# Patient Record
Sex: FEMALE | Race: WHITE | NOT HISPANIC OR LATINO | Employment: UNEMPLOYED | URBAN - METROPOLITAN AREA
[De-identification: names, ages, dates, MRNs, and addresses within clinical notes are randomized per-mention and may not be internally consistent; named-entity substitution may affect disease eponyms.]

---

## 2020-01-06 ENCOUNTER — INITIAL PRENATAL (OUTPATIENT)
Dept: OBGYN | Facility: CLINIC | Age: 30
End: 2020-01-06
Payer: MEDICAID

## 2020-01-06 ENCOUNTER — HOSPITAL ENCOUNTER (OUTPATIENT)
Facility: MEDICAL CENTER | Age: 30
End: 2020-01-06
Attending: NURSE PRACTITIONER
Payer: MEDICAID

## 2020-01-06 VITALS — SYSTOLIC BLOOD PRESSURE: 118 MMHG | DIASTOLIC BLOOD PRESSURE: 50 MMHG | WEIGHT: 152 LBS | BODY MASS INDEX: 25.29 KG/M2

## 2020-01-06 DIAGNOSIS — N93.8 DUB (DYSFUNCTIONAL UTERINE BLEEDING): Primary | ICD-10-CM

## 2020-01-06 DIAGNOSIS — F15.21 METHAMPHETAMINE DEPENDENCE IN REMISSION (HCC): ICD-10-CM

## 2020-01-06 DIAGNOSIS — F15.10 METHAMPHETAMINE ABUSE (HCC): ICD-10-CM

## 2020-01-06 DIAGNOSIS — Z91.410 HISTORY OF RAPE IN ADULTHOOD: ICD-10-CM

## 2020-01-06 DIAGNOSIS — Z32.00 ENCOUNTER FOR PREGNANCY TEST, RESULT UNKNOWN: ICD-10-CM

## 2020-01-06 PROBLEM — F32.A DEPRESSION: Status: ACTIVE | Noted: 2020-01-06

## 2020-01-06 LAB
INT CON NEG: NEGATIVE
INT CON POS: POSITIVE
POC URINE PREGNANCY TEST: POSITIVE

## 2020-01-06 PROCEDURE — 87491 CHLMYD TRACH DNA AMP PROBE: CPT

## 2020-01-06 PROCEDURE — 87591 N.GONORRHOEAE DNA AMP PROB: CPT

## 2020-01-06 PROCEDURE — 81025 URINE PREGNANCY TEST: CPT | Performed by: NURSE PRACTITIONER

## 2020-01-06 PROCEDURE — 88175 CYTOPATH C/V AUTO FLUID REDO: CPT

## 2020-01-06 PROCEDURE — 99385 PREV VISIT NEW AGE 18-39: CPT | Performed by: NURSE PRACTITIONER

## 2020-01-06 PROCEDURE — 87624 HPV HI-RISK TYP POOLED RSLT: CPT

## 2020-01-06 RX ORDER — ALBUTEROL SULFATE 90 UG/1
2 AEROSOL, METERED RESPIRATORY (INHALATION) EVERY 6 HOURS PRN
Qty: 8.5 G | Refills: 1 | Status: SHIPPED | OUTPATIENT
Start: 2020-01-06

## 2020-01-06 ASSESSMENT — ENCOUNTER SYMPTOMS
NEUROLOGICAL NEGATIVE: 1
PSYCHIATRIC NEGATIVE: 1
RESPIRATORY NEGATIVE: 1
CARDIOVASCULAR NEGATIVE: 1
MUSCULOSKELETAL NEGATIVE: 1
CONSTITUTIONAL NEGATIVE: 1
GASTROINTESTINAL NEGATIVE: 1
EYES NEGATIVE: 1

## 2020-01-06 NOTE — LETTER
January 6, 2020            Thu Caraballo is pregnant with an estimated delivery date of 07/25/2020 at this time.        Thank you,          GORDO Waters.    Electronically Signed

## 2020-01-06 NOTE — PROGRESS NOTES
Thu Caraballo is a 29 y.o. y.o. female who presents for dysfunctional uterine bleeding.        HPI Comments: Pt presents for dysfunctional uterine bleeding. Pt has no complaints today. LMP 10/26/19.  Reports methamphetamine use up until 1 week ago as well as some alcohol use until she found out she was pregnant.  She is currently being seen at [facility] 2-3 times/week for counseling services.    .  Review of Systems   Pertinent positives documented in HPI and all other systems reviewed & are negative  Review of Systems   Constitutional: Negative.    HENT: Negative.    Eyes: Negative.    Respiratory: Negative.    Cardiovascular: Negative.    Gastrointestinal: Negative.    Genitourinary: Negative.    Musculoskeletal: Negative.    Skin: Negative.    Neurological: Negative.    Endo/Heme/Allergies: Negative.    Psychiatric/Behavioral: Negative.    All other systems reviewed and are negative.      All PMH, PSH, allergies, social history and FH reviewed and updated today:  Past Medical History:   Diagnosis Date   • Anxiety    • Back pain    • Depression    • Insomnia    • Migraine    • Panic disorder    • PTSD (post-traumatic stress disorder)      No past surgical history on file.  Patient has no known allergies.  Social History     Socioeconomic History   • Marital status: Single     Spouse name: Not on file   • Number of children: Not on file   • Years of education: Not on file   • Highest education level: Not on file   Occupational History   • Not on file   Social Needs   • Financial resource strain: Not on file   • Food insecurity:     Worry: Not on file     Inability: Not on file   • Transportation needs:     Medical: Not on file     Non-medical: Not on file   Tobacco Use   • Smoking status: Not on file   Substance and Sexual Activity   • Alcohol use: Yes     Comment: Socially   • Drug use: Not on file   • Sexual activity: Not Currently   Lifestyle   • Physical activity:     Days per week: Not on file     Minutes  per session: Not on file   • Stress: Not on file   Relationships   • Social connections:     Talks on phone: Not on file     Gets together: Not on file     Attends [employer] service: Not on file     Active member of club or organization: Not on file     Attends meetings of clubs or organizations: Not on file     Relationship status: Not on file   • Intimate partner violence:     Fear of current or ex partner: Not on file     Emotionally abused: Not on file     Physically abused: Not on file     Forced sexual activity: Not on file   Other Topics Concern   • Not on file   Social History Narrative    ** Merged History Encounter **          Family History   Problem Relation Age of Onset   • Other Mother      Medications:   Current Outpatient Medications Ordered in Epic   Medication Sig Dispense Refill   • oxycodone-acetaminophen (PERCOCET) 5-325 MG TABS Take 1-2 Tabs by mouth every 6 hours as needed. 20 Each 0   • fluoxetine (PROZAC) 20 MG CAPS Take 20 mg by mouth 2 times a day.     • alprazolam (XANAX) 1 MG TABS Take 1 mg by mouth at bedtime as needed.     • alprazolam (XANAX) 0.5 MG TABS Take 0.5 mg by mouth at bedtime as needed.     • fluoxetine (PROZAC) 20 MG CAPS Take 1 Cap by mouth every day. 30 Cap 0   • oxycodone-acetaminophen (PERCOCET) 7.5-325 MG per tablet Take 1 Tab by mouth every four hours as needed. 20 Each 0     No current [facility]-ordered facility-administered medications on file.           Objective:   Vital measurements:  There were no vitals taken for this visit.  There is no height or weight on file to calculate BMI. (Goal BM I>18 <25)    Physical Exam   Nursing note and vitals reviewed.  Constitutional: She is oriented to person, place, and time. She appears well-developed and well-nourished. No distress.     HEENT:   Head: Normocephalic and atraumatic.   Right Ear: External ear normal.   Left Ear: External ear normal.   Nose: Nose normal.   Eyes: Conjunctivae and EOM are normal. Pupils are equal,  round, and reactive to light. No scleral icterus.     Neck: Normal range of motion. Neck supple. No tracheal deviation present. No thyromegaly present.     Pulmonary/Chest: Effort normal and breath sounds normal. No respiratory distress. She has no wheezes. She has no rales. She exhibits no tenderness.     Cardiovascular: Regular, rate and rhythm. No JVD.    Abdominal: Soft. Bowel sounds are normal. She exhibits no distension and no mass. No tenderness. She has no rebound and no guarding.     Genitourinary:    Musculoskeletal: Normal range of motion. She exhibits no edema and no tenderness.     Lymphadenopathy: She has no cervical adenopathy.     Neurological: She is alert and oriented to person, place, and time. She exhibits normal muscle tone.     Skin: Skin is warm and dry. No rash noted. She is not diaphoretic. No erythema. No pallor.     Psychiatric: She is very tired appearing with her eyes frequently closing and appearing to doze off during the exam.  She is teary at times during the exam.         + fetal cardiac activity and fetal movement on BSUS       Assessment:     Dysfunctional uterine bleeding  Positive pregnancy test  Methamphetamine abuse  Alcohol use during pregnancy  Asthma    Plan:   Pap and physical exam performed  Referral to Dr So for hx of meth use in pregnancy and counseling on remaining drug free during pregnancy.  Referral to cardiology for hx of daily meth use  PNP and UDS ordered  Referral to    Albuterol inhaler rx  New OB 2-4 weeks

## 2020-01-06 NOTE — NON-PROVIDER
Pt here for her Confirmation of pregnancy   LMP: 10/19/19  Pregnancy test: positive  Ph# [phone]  Pharmacy confirmed w/ pt

## 2020-01-07 DIAGNOSIS — Z32.00 ENCOUNTER FOR PREGNANCY TEST, RESULT UNKNOWN: ICD-10-CM

## 2020-01-07 LAB
C TRACH DNA GENITAL QL NAA+PROBE: NEGATIVE
CYTOLOGY REG CYTOL: NORMAL
HPV HR 12 DNA CVX QL NAA+PROBE: NEGATIVE
HPV16 DNA SPEC QL NAA+PROBE: NEGATIVE
HPV18 DNA SPEC QL NAA+PROBE: NEGATIVE
N GONORRHOEA DNA GENITAL QL NAA+PROBE: NEGATIVE
SPECIMEN SOURCE: NORMAL
SPECIMEN SOURCE: NORMAL

## 2020-01-21 ENCOUNTER — TELEPHONE (OUTPATIENT)
Dept: OBGYN | Facility: CLINIC | Age: 30
End: 2020-01-21

## 2020-01-21 NOTE — TELEPHONE ENCOUNTER
----- Message from LIU Waters sent at 1/7/2020  7:25 PM PST -----  Negative pap.  If she has s/s of yeast infection she can take an OTC monistat 7 day treatment.    Unable to contact pt, msg left to call back.

## 2020-07-03 ENCOUNTER — HOSPITAL ENCOUNTER (OUTPATIENT)
Dept: HOSPITAL 8 - LDOP | Age: 30
Discharge: HOME | End: 2020-07-03
Attending: OBSTETRICS & GYNECOLOGY
Payer: MEDICAID

## 2020-07-03 VITALS — BODY MASS INDEX: 31.16 KG/M2 | WEIGHT: 187 LBS | HEIGHT: 65 IN

## 2020-07-03 VITALS — DIASTOLIC BLOOD PRESSURE: 66 MMHG | SYSTOLIC BLOOD PRESSURE: 132 MMHG

## 2020-07-03 DIAGNOSIS — O26.893: Primary | ICD-10-CM

## 2020-07-03 DIAGNOSIS — Z3A.35: ICD-10-CM

## 2020-07-03 DIAGNOSIS — M79.89: ICD-10-CM

## 2020-07-03 LAB
ALBUMIN SERPL-MCNC: 2.5 G/DL (ref 3.4–5)
ALP SERPL-CCNC: 175 U/L (ref 45–117)
ALT SERPL-CCNC: 13 U/L (ref 12–78)
ANION GAP SERPL CALC-SCNC: 9 MMOL/L (ref 5–15)
BASOPHILS # BLD AUTO: 0.01 X10^3/UL (ref 0–0.1)
BASOPHILS NFR BLD AUTO: 0 % (ref 0–1)
BILIRUB SERPL-MCNC: 0.2 MG/DL (ref 0.2–1)
CALCIUM SERPL-MCNC: 8.2 MG/DL (ref 8.5–10.1)
CHLORIDE SERPL-SCNC: 113 MMOL/L (ref 98–107)
CREAT SERPL-MCNC: 0.7 MG/DL (ref 0.55–1.02)
EOSINOPHIL # BLD AUTO: 0.28 X10^3/UL (ref 0–0.4)
EOSINOPHIL NFR BLD AUTO: 3 % (ref 1–7)
ERYTHROCYTE [DISTWIDTH] IN BLOOD BY AUTOMATED COUNT: 13.3 % (ref 9.6–15.2)
HBV CORE IGM SERPL QL IA: 19 MG/DL (ref 0–12)
HBV SURFACE AB SER RIA-ACNC: 105 MG/DL
LYMPHOCYTES # BLD AUTO: 1.8 X10^3/UL (ref 1–3.4)
LYMPHOCYTES NFR BLD AUTO: 18 % (ref 22–44)
MCH RBC QN AUTO: 28.1 PG (ref 27–34.8)
MCHC RBC AUTO-ENTMCNC: 33.4 G/DL (ref 32.4–35.8)
MCV RBC AUTO: 83.9 FL (ref 80–100)
MD: NO
MICROSCOPIC: (no result)
MONOCYTES # BLD AUTO: 0.83 X10^3/UL (ref 0.2–0.8)
MONOCYTES NFR BLD AUTO: 8 % (ref 2–9)
NEUTROPHILS # BLD AUTO: 7.21 X10^3/UL (ref 1.8–6.8)
NEUTROPHILS NFR BLD AUTO: 71 % (ref 42–75)
PLATELET # BLD AUTO: 284 X10^3/UL (ref 130–400)
PMV BLD AUTO: 8.2 FL (ref 7.4–10.4)
PROT SERPL-MCNC: 6.6 G/DL (ref 6.4–8.2)
RBC # BLD AUTO: 3.51 X10^6/UL (ref 3.82–5.3)

## 2020-07-03 PROCEDURE — 59025 FETAL NON-STRESS TEST: CPT

## 2020-07-03 PROCEDURE — 36415 COLL VENOUS BLD VENIPUNCTURE: CPT

## 2020-07-03 PROCEDURE — 80307 DRUG TEST PRSMV CHEM ANLYZR: CPT

## 2020-07-03 PROCEDURE — 82248 BILIRUBIN DIRECT: CPT

## 2020-07-03 PROCEDURE — 84550 ASSAY OF BLOOD/URIC ACID: CPT

## 2020-07-03 PROCEDURE — 82570 ASSAY OF URINE CREATININE: CPT

## 2020-07-03 PROCEDURE — 81001 URINALYSIS AUTO W/SCOPE: CPT

## 2020-07-03 PROCEDURE — 85025 COMPLETE CBC W/AUTO DIFF WBC: CPT

## 2020-07-03 PROCEDURE — 99211 OFF/OP EST MAY X REQ PHY/QHP: CPT

## 2020-07-03 PROCEDURE — G0463 HOSPITAL OUTPT CLINIC VISIT: HCPCS

## 2020-07-03 PROCEDURE — 80053 COMPREHEN METABOLIC PANEL: CPT

## 2020-07-03 PROCEDURE — 84156 ASSAY OF PROTEIN URINE: CPT

## 2020-07-25 ENCOUNTER — HOSPITAL ENCOUNTER (INPATIENT)
Dept: HOSPITAL 8 - LDOP | Age: 30
LOS: 3 days | Discharge: HOME | End: 2020-07-28
Attending: OBSTETRICS & GYNECOLOGY | Admitting: OBSTETRICS & GYNECOLOGY
Payer: MEDICAID

## 2020-07-25 VITALS — WEIGHT: 211.42 LBS | BODY MASS INDEX: 36.09 KG/M2 | HEIGHT: 64 IN

## 2020-07-25 VITALS — SYSTOLIC BLOOD PRESSURE: 133 MMHG | DIASTOLIC BLOOD PRESSURE: 66 MMHG

## 2020-07-25 DIAGNOSIS — Z82.49: ICD-10-CM

## 2020-07-25 DIAGNOSIS — F32.9: ICD-10-CM

## 2020-07-25 DIAGNOSIS — F41.9: ICD-10-CM

## 2020-07-25 DIAGNOSIS — F15.10: ICD-10-CM

## 2020-07-25 DIAGNOSIS — Z3A.39: ICD-10-CM

## 2020-07-25 DIAGNOSIS — F43.10: ICD-10-CM

## 2020-07-25 DIAGNOSIS — Z20.828: ICD-10-CM

## 2020-07-25 LAB
BASOPHILS # BLD AUTO: 0.02 X10^3/UL (ref 0–0.1)
BASOPHILS NFR BLD AUTO: 0 % (ref 0–1)
EOSINOPHIL # BLD AUTO: 0.16 X10^3/UL (ref 0–0.4)
EOSINOPHIL NFR BLD AUTO: 2 % (ref 1–7)
ERYTHROCYTE [DISTWIDTH] IN BLOOD BY AUTOMATED COUNT: 15.1 % (ref 9.6–15.2)
LYMPHOCYTES # BLD AUTO: 1.63 X10^3/UL (ref 1–3.4)
LYMPHOCYTES NFR BLD AUTO: 19 % (ref 22–44)
MCH RBC QN AUTO: 28.1 PG (ref 27–34.8)
MCHC RBC AUTO-ENTMCNC: 33.3 G/DL (ref 32.4–35.8)
MCV RBC AUTO: 84.4 FL (ref 80–100)
MD: NO
MONOCYTES # BLD AUTO: 0.62 X10^3/UL (ref 0.2–0.8)
MONOCYTES NFR BLD AUTO: 7 % (ref 2–9)
NEUTROPHILS # BLD AUTO: 6.4 X10^3/UL (ref 1.8–6.8)
NEUTROPHILS NFR BLD AUTO: 72 % (ref 42–75)
PLATELET # BLD AUTO: 303 X10^3/UL (ref 130–400)
PMV BLD AUTO: 8 FL (ref 7.4–10.4)
RBC # BLD AUTO: 3.53 X10^6/UL (ref 3.82–5.3)

## 2020-07-25 PROCEDURE — 80307 DRUG TEST PRSMV CHEM ANLYZR: CPT

## 2020-07-25 PROCEDURE — 36415 COLL VENOUS BLD VENIPUNCTURE: CPT

## 2020-07-25 PROCEDURE — 88307 TISSUE EXAM BY PATHOLOGIST: CPT

## 2020-07-25 PROCEDURE — 85025 COMPLETE CBC W/AUTO DIFF WBC: CPT

## 2020-07-25 PROCEDURE — 86900 BLOOD TYPING SEROLOGIC ABO: CPT

## 2020-07-25 PROCEDURE — 86850 RBC ANTIBODY SCREEN: CPT

## 2020-07-25 PROCEDURE — 87635 SARS-COV-2 COVID-19 AMP PRB: CPT

## 2020-07-25 PROCEDURE — 86592 SYPHILIS TEST NON-TREP QUAL: CPT

## 2020-07-25 RX ADMIN — SODIUM CHLORIDE, SODIUM LACTATE, POTASSIUM CHLORIDE, AND CALCIUM CHLORIDE SCH MLS/HR: .6; .31; .03; .02 INJECTION, SOLUTION INTRAVENOUS at 20:03

## 2020-07-25 RX ADMIN — OLANZAPINE SCH MG: 2.5 TABLET, FILM COATED ORAL at 20:46

## 2020-07-26 VITALS — SYSTOLIC BLOOD PRESSURE: 115 MMHG | DIASTOLIC BLOOD PRESSURE: 75 MMHG

## 2020-07-26 VITALS — DIASTOLIC BLOOD PRESSURE: 65 MMHG | SYSTOLIC BLOOD PRESSURE: 119 MMHG

## 2020-07-26 LAB
BASOPHILS # BLD AUTO: 0 X10^3/UL (ref 0–0.1)
BASOPHILS NFR BLD AUTO: 0 % (ref 0–1)
EOSINOPHIL # BLD AUTO: 0.08 X10^3/UL (ref 0–0.4)
EOSINOPHIL NFR BLD AUTO: 0 % (ref 1–7)
ERYTHROCYTE [DISTWIDTH] IN BLOOD BY AUTOMATED COUNT: 15.7 % (ref 9.6–15.2)
LYMPHOCYTES # BLD AUTO: 1.52 X10^3/UL (ref 1–3.4)
LYMPHOCYTES NFR BLD AUTO: 8 % (ref 22–44)
MCH RBC QN AUTO: 27.9 PG (ref 27–34.8)
MCHC RBC AUTO-ENTMCNC: 32.5 G/DL (ref 32.4–35.8)
MCV RBC AUTO: 86 FL (ref 80–100)
MD: (no result)
MONOCYTES # BLD AUTO: 1.15 X10^3/UL (ref 0.2–0.8)
MONOCYTES NFR BLD AUTO: 6 % (ref 2–9)
NEUTROPHILS # BLD AUTO: 15.41 X10^3/UL (ref 1.8–6.8)
NEUTROPHILS NFR BLD AUTO: 85 % (ref 42–75)
PLATELET # BLD AUTO: 254 X10^3/UL (ref 130–400)
PMV BLD AUTO: 7.9 FL (ref 7.4–10.4)
RBC # BLD AUTO: 3.78 X10^6/UL (ref 3.82–5.3)

## 2020-07-26 RX ADMIN — BUPRENORPHINE HCL SCH MG: 2 TABLET SUBLINGUAL at 07:28

## 2020-07-26 RX ADMIN — OLANZAPINE SCH MG: 2.5 TABLET, FILM COATED ORAL at 21:00

## 2020-07-26 RX ADMIN — SODIUM CHLORIDE, SODIUM LACTATE, POTASSIUM CHLORIDE, AND CALCIUM CHLORIDE SCH MLS/HR: .6; .31; .03; .02 INJECTION, SOLUTION INTRAVENOUS at 07:34

## 2020-07-26 RX ADMIN — Medication SCH MLS/HR: at 12:23

## 2020-07-26 RX ADMIN — SODIUM CHLORIDE, SODIUM LACTATE, POTASSIUM CHLORIDE, AND CALCIUM CHLORIDE SCH MLS/HR: .6; .31; .03; .02 INJECTION, SOLUTION INTRAVENOUS at 02:24

## 2020-07-26 RX ADMIN — SODIUM CHLORIDE, SODIUM LACTATE, POTASSIUM CHLORIDE, CALCIUM CHLORIDE, AND DEXTROSE MONOHYDRATE SCH MLS/HR: 600; 310; 30; 20; 5 INJECTION, SOLUTION INTRAVENOUS at 02:24

## 2020-07-26 RX ADMIN — IBUPROFEN PRN MG: 800 TABLET ORAL at 22:50

## 2020-07-26 RX ADMIN — Medication SCH MLS/HR: at 22:23

## 2020-07-26 RX ADMIN — DOCUSATE SODIUM PRN MG: 100 CAPSULE, LIQUID FILLED ORAL at 20:59

## 2020-07-26 RX ADMIN — SODIUM CHLORIDE, SODIUM LACTATE, POTASSIUM CHLORIDE, CALCIUM CHLORIDE, AND DEXTROSE MONOHYDRATE SCH MLS/HR: 600; 310; 30; 20; 5 INJECTION, SOLUTION INTRAVENOUS at 07:31

## 2020-07-26 RX ADMIN — IBUPROFEN PRN MG: 800 TABLET ORAL at 14:36

## 2020-07-27 VITALS — SYSTOLIC BLOOD PRESSURE: 119 MMHG | DIASTOLIC BLOOD PRESSURE: 77 MMHG

## 2020-07-27 VITALS — SYSTOLIC BLOOD PRESSURE: 133 MMHG | DIASTOLIC BLOOD PRESSURE: 86 MMHG

## 2020-07-27 VITALS — DIASTOLIC BLOOD PRESSURE: 80 MMHG | SYSTOLIC BLOOD PRESSURE: 135 MMHG

## 2020-07-27 VITALS — DIASTOLIC BLOOD PRESSURE: 82 MMHG | SYSTOLIC BLOOD PRESSURE: 121 MMHG

## 2020-07-27 VITALS — DIASTOLIC BLOOD PRESSURE: 72 MMHG | SYSTOLIC BLOOD PRESSURE: 120 MMHG

## 2020-07-27 PROCEDURE — 3E033VJ INTRODUCTION OF OTHER HORMONE INTO PERIPHERAL VEIN, PERCUTANEOUS APPROACH: ICD-10-PCS | Performed by: OBSTETRICS & GYNECOLOGY

## 2020-07-27 RX ADMIN — PRENATAL VIT W/ FE FUMARATE-FA TAB 27-0.8 MG SCH EACH: 27-0.8 TAB at 08:08

## 2020-07-27 RX ADMIN — Medication SCH MLS/HR: at 08:23

## 2020-07-27 RX ADMIN — IBUPROFEN PRN MG: 800 TABLET ORAL at 22:58

## 2020-07-27 RX ADMIN — DOCUSATE SODIUM PRN MG: 100 CAPSULE, LIQUID FILLED ORAL at 08:08

## 2020-07-27 RX ADMIN — IBUPROFEN PRN MG: 800 TABLET ORAL at 14:20

## 2020-07-27 RX ADMIN — Medication SCH MLS/HR: at 18:23

## 2020-07-27 RX ADMIN — OLANZAPINE SCH MG: 2.5 TABLET, FILM COATED ORAL at 21:04

## 2020-07-27 RX ADMIN — IBUPROFEN PRN MG: 800 TABLET ORAL at 06:30

## 2020-07-27 RX ADMIN — BUPRENORPHINE HCL SCH MG: 2 TABLET SUBLINGUAL at 08:08

## 2020-07-27 RX ADMIN — DOCUSATE SODIUM PRN MG: 100 CAPSULE, LIQUID FILLED ORAL at 21:04

## 2020-07-28 VITALS — DIASTOLIC BLOOD PRESSURE: 76 MMHG | SYSTOLIC BLOOD PRESSURE: 134 MMHG

## 2020-07-28 RX ADMIN — DOCUSATE SODIUM PRN MG: 100 CAPSULE, LIQUID FILLED ORAL at 09:31

## 2020-07-28 RX ADMIN — IBUPROFEN PRN MG: 800 TABLET ORAL at 09:31

## 2020-07-28 RX ADMIN — PRENATAL VIT W/ FE FUMARATE-FA TAB 27-0.8 MG SCH EACH: 27-0.8 TAB at 09:31

## 2020-07-28 RX ADMIN — OLANZAPINE SCH MG: 2.5 TABLET, FILM COATED ORAL at 09:00

## 2020-07-28 RX ADMIN — Medication SCH MLS/HR: at 04:23

## 2020-07-28 RX ADMIN — BUPRENORPHINE HCL SCH MG: 2 TABLET SUBLINGUAL at 09:32

## 2020-07-28 RX ADMIN — Medication SCH MLS/HR: at 14:23

## 2021-01-12 ENCOUNTER — OFFICE VISIT (OUTPATIENT)
Dept: URGENT CARE | Facility: CLINIC | Age: 31
End: 2021-01-12
Payer: MEDICAID

## 2021-01-12 VITALS
OXYGEN SATURATION: 95 % | HEIGHT: 64 IN | TEMPERATURE: 97.6 F | HEART RATE: 76 BPM | RESPIRATION RATE: 14 BRPM | WEIGHT: 200 LBS | DIASTOLIC BLOOD PRESSURE: 72 MMHG | BODY MASS INDEX: 34.15 KG/M2 | SYSTOLIC BLOOD PRESSURE: 124 MMHG

## 2021-01-12 DIAGNOSIS — L85.3 DRY SKIN: ICD-10-CM

## 2021-01-12 DIAGNOSIS — Z20.7 EXPOSURE TO SCABIES: ICD-10-CM

## 2021-01-12 PROCEDURE — 99203 OFFICE O/P NEW LOW 30 MIN: CPT | Performed by: PHYSICIAN ASSISTANT

## 2021-01-12 RX ORDER — PERMETHRIN 50 MG/G
CREAM TOPICAL
Qty: 60 G | Refills: 0 | Status: SHIPPED | OUTPATIENT
Start: 2021-01-12

## 2021-01-12 ASSESSMENT — ENCOUNTER SYMPTOMS
EYE DISCHARGE: 0
FEVER: 0
CHILLS: 0
EYE REDNESS: 0
SORE THROAT: 0

## 2021-01-13 NOTE — PROGRESS NOTES
"Subjective:      Thu Caraballo is a 30 y.o. female who presents with Other (scabies , was in an outbreak of scabies were she lives)            Patient is a 30-year-old female who presents to urgent care requesting treatment prophylactically for scabies.  Patient is currently at [facility] where she reports that there is \"an outbreak \"of scabies along with lice.  She does report some dry skin to her inner thighs otherwise is without notable rash that she is aware of.  She also denies any itchy scalp.  She does report that she has been at this facility for 6 months.  No one in her home is with scabies or lice as it is a different household.  She also mentions that she has a 5 month old at the facility as well- no evidence of rash or itchiness.       Other  This is a new problem. The current episode started today. Pertinent negatives include no chills, fever, rash or sore throat. Nothing aggravates the symptoms. She has tried nothing for the symptoms.       Review of Systems   Constitutional: Negative for chills and fever.   HENT: Negative for sore throat.    Eyes: Negative for discharge and redness.   Skin: Negative for itching and rash.        Dry skin to inner thighs.      All other systems reviewed and are negative.         Objective:     /72   Pulse 76   Temp 36.4 °C (97.6 °F) (Temporal)   Resp 14   Ht 1.626 m (5' 4\")   Wt 90.7 kg (200 lb)   SpO2 95%   BMI 34.33 kg/m²    PMH:  has a past medical history of Anxiety, Asthma, Back pain, Depression, Head ache, Insomnia, Migraine, Panic disorder, and PTSD (post-traumatic stress disorder). She also has no past medical history of Addisons disease ([facility]), Adrenal disorder (HCC), Allergy, Anemia, Arrhythmia, Arthritis, Blood transfusion without reported diagnosis, Cancer ([facility]), Cataract, CHF (congestive heart failure) ([facility]), Clotting disorder ([facility]), COPD (chronic obstructive pulmonary disease) ([facility]), Cushings syndrome ([facility]), Diabetes ([facility]), Diabetic neuropathy " (HCC), GERD (gastroesophageal reflux disease), Glaucoma, Goiter, Heart attack (HCC), Heart murmur, HIV (human immunodeficiency virus infection) (HCC), Hyperlipidemia, Hypertension, IBD (inflammatory bowel disease), Kidney disease, Meningitis, Muscle disorder, Osteoporosis, Parathyroid disorder (HCC), Pituitary disease (HCC), Pulmonary emphysema (HCC), Seizure (HCC), Sickle cell disease (HCC), Stroke (HCC), Substance abuse (HCC), Thyroid disease, Tuberculosis, or Urinary tract infection.  MEDS: Reviewed .   ALLERGIES: No Known Allergies  SURGHX: No past surgical history on file.  SOCHX:  reports that she has been smoking cigarettes. She has been smoking about 0.50 packs per day. She has never used smokeless tobacco. She reports current alcohol use. She reports current drug use. Drug: Methamphetamines.  FH: Family history was reviewed, no pertinent findings to report    Physical Exam  Vitals signs reviewed.   Constitutional:       General: She is not in acute distress.     Appearance: She is well-developed.   HENT:      Head: Normocephalic and atraumatic.   Eyes:      Conjunctiva/sclera: Conjunctivae normal.      Pupils: Pupils are equal, round, and reactive to light.   Neck:      Musculoskeletal: Normal range of motion and neck supple.      Trachea: No tracheal deviation.   Cardiovascular:      Rate and Rhythm: Normal rate.   Pulmonary:      Effort: Pulmonary effort is normal. No respiratory distress.   Skin:     General: Skin is warm.             Comments: Erythematous patches to bilateral inner thighs. Without pruritis, burrows, or excoriation.      Neurological:      Mental Status: She is alert and oriented to person, place, and time.      Coordination: Coordination normal.   Psychiatric:         Behavior: Behavior normal.         Thought Content: Thought content normal.         Judgment: Judgment normal.                 Assessment/Plan:        1. Exposure to scabies  - permethrin (ELIMITE) 5 % Cream; Apply thin  "layer from scalp to toes- leave on 8-10 hours and then rinse.  Dispense: 60 g; Refill: 0    2. Dry skin    Area to thighs is consistent with dry patches- no evidence of infestation.   Will write for the above.   Discussed that I do not feel comfortable tx. 5 month old \"prophylactivally\" for scabies if not present- encouraged her to reach out to peds to further discuss this if needed.   RTC PRN.   Please note that this dictation was created using voice recognition software. I have made every reasonable attempt to correct obvious errors, but I expect that there are errors of grammar and possibly content that I did not discover before finalizing the note.    Appropriate PPE worn at all times by provider.   Pt. Had face mask on throughout entirety of the visit other than oropharyngeal examination today.         "

## 2021-09-30 ENCOUNTER — HOSPITAL ENCOUNTER (EMERGENCY)
Dept: HOSPITAL 8 - ED | Age: 31
Discharge: HOME | End: 2021-09-30
Payer: MEDICAID

## 2021-09-30 VITALS — WEIGHT: 192.9 LBS | HEIGHT: 64 IN | BODY MASS INDEX: 32.93 KG/M2

## 2021-09-30 VITALS — SYSTOLIC BLOOD PRESSURE: 127 MMHG | DIASTOLIC BLOOD PRESSURE: 65 MMHG

## 2021-09-30 DIAGNOSIS — F17.210: ICD-10-CM

## 2021-09-30 DIAGNOSIS — N30.01: Primary | ICD-10-CM

## 2021-09-30 DIAGNOSIS — R11.0: ICD-10-CM

## 2021-09-30 DIAGNOSIS — R10.30: ICD-10-CM

## 2021-09-30 DIAGNOSIS — R30.0: ICD-10-CM

## 2022-09-23 ENCOUNTER — HOSPITAL ENCOUNTER (EMERGENCY)
Facility: MEDICAL CENTER | Age: 32
End: 2022-09-23
Attending: EMERGENCY MEDICINE
Payer: COMMERCIAL

## 2022-09-23 VITALS
OXYGEN SATURATION: 95 % | DIASTOLIC BLOOD PRESSURE: 76 MMHG | WEIGHT: 214.95 LBS | TEMPERATURE: 98.4 F | BODY MASS INDEX: 36.7 KG/M2 | SYSTOLIC BLOOD PRESSURE: 132 MMHG | HEIGHT: 64 IN | RESPIRATION RATE: 16 BRPM | HEART RATE: 62 BPM

## 2022-09-23 DIAGNOSIS — K04.7 DENTAL ABSCESS: ICD-10-CM

## 2022-09-23 DIAGNOSIS — K02.9 PAIN DUE TO DENTAL CARIES: ICD-10-CM

## 2022-09-23 DIAGNOSIS — K01.1 IMPACTED THIRD MOLAR TOOTH: ICD-10-CM

## 2022-09-23 PROCEDURE — A9270 NON-COVERED ITEM OR SERVICE: HCPCS | Performed by: EMERGENCY MEDICINE

## 2022-09-23 PROCEDURE — 700102 HCHG RX REV CODE 250 W/ 637 OVERRIDE(OP): Performed by: EMERGENCY MEDICINE

## 2022-09-23 PROCEDURE — 96372 THER/PROPH/DIAG INJ SC/IM: CPT

## 2022-09-23 PROCEDURE — 700111 HCHG RX REV CODE 636 W/ 250 OVERRIDE (IP): Performed by: EMERGENCY MEDICINE

## 2022-09-23 PROCEDURE — 99283 EMERGENCY DEPT VISIT LOW MDM: CPT

## 2022-09-23 RX ORDER — CLINDAMYCIN HYDROCHLORIDE 150 MG/1
300 CAPSULE ORAL ONCE
Status: COMPLETED | OUTPATIENT
Start: 2022-09-23 | End: 2022-09-23

## 2022-09-23 RX ORDER — CLINDAMYCIN HYDROCHLORIDE 300 MG/1
300 CAPSULE ORAL 3 TIMES DAILY
Qty: 30 CAPSULE | Refills: 0 | Status: SHIPPED | OUTPATIENT
Start: 2022-09-23 | End: 2022-10-03

## 2022-09-23 RX ORDER — HYDROMORPHONE HYDROCHLORIDE 1 MG/ML
1 INJECTION, SOLUTION INTRAMUSCULAR; INTRAVENOUS; SUBCUTANEOUS ONCE
Status: COMPLETED | OUTPATIENT
Start: 2022-09-23 | End: 2022-09-23

## 2022-09-23 RX ORDER — OXYCODONE AND ACETAMINOPHEN 7.5; 325 MG/1; MG/1
1 TABLET ORAL EVERY 4 HOURS PRN
Qty: 30 TABLET | Refills: 0 | Status: SHIPPED | OUTPATIENT
Start: 2022-09-23 | End: 2022-09-28

## 2022-09-23 RX ORDER — ONDANSETRON 4 MG/1
4 TABLET, ORALLY DISINTEGRATING ORAL ONCE
Status: COMPLETED | OUTPATIENT
Start: 2022-09-23 | End: 2022-09-23

## 2022-09-23 RX ADMIN — ONDANSETRON 4 MG: 4 TABLET, ORALLY DISINTEGRATING ORAL at 21:30

## 2022-09-23 RX ADMIN — HYDROMORPHONE HYDROCHLORIDE 1 MG: 1 INJECTION, SOLUTION INTRAMUSCULAR; INTRAVENOUS; SUBCUTANEOUS at 21:30

## 2022-09-23 RX ADMIN — CLINDAMYCIN HYDROCHLORIDE 300 MG: 150 CAPSULE ORAL at 21:37

## 2022-09-24 NOTE — ED TRIAGE NOTES
"Chief Complaint   Patient presents with    Dental Pain     For a few months.     Pt complains of dental pain which is generalized to entire mouth. Pt reports she was seen recently and prescribed antibiotics. Pt states pain has continued and worsened. Pt states she has multiple abscesses.   Tylenol and ibuprofen not helping.  Pain rated at a 10/10.   Pt obviously uncomfortable.    /74   Pulse 85   Temp 36.5 °C (97.7 °F) (Temporal)   Resp 16   Ht 1.626 m (5' 4\")   Wt 97.5 kg (214 lb 15.2 oz)   SpO2 98%   BMI 36.90 kg/m²     "

## 2022-09-24 NOTE — ED PROVIDER NOTES
ED Provider Note    ED Provider Note    Scribed for Sonya Steele MD by Sonya Steele M.D.. 9/23/2022, 9:18 PM.    Primary care provider: Pcp Pt States None  Means of arrival: Private  History obtained from: Patient  History limited by: None    CHIEF COMPLAINT  Chief Complaint   Patient presents with    Dental Pain     For a few months.       PILO Caraballo is a 32 y.o. female who presents to the Emergency Department for evaluation of dental discomfort.  Patient relates chronically poor dentition, she has had discomfort waxing waning for the last few months.  She has recently seen a new dentist and was found to have multiple broken teeth, several cavities, and was told she will need her wisdom tooth resected.  She notes pain became quite severe yesterday evening, localized to both the mandible and maxilla bilaterally, more so to the left.  No fever, no recent trauma.  2 weeks ago she was seen for dental discomfort and started on a course of amoxicillin.  She completed that with no significant improvement and notes pain is in fact much worse today.  She also endorses nausea with about 3 episodes of emesis.    REVIEW OF SYSTEMS  Pertinent positives include mandible and maxillary discomfort with poor dentition, left more than right, nausea. Pertinent negatives include no fever, no trauma, no drainage, no bleeding.      PAST MEDICAL HISTORY   has a past medical history of Anxiety, Asthma, Back pain, Depression, Head ache, Insomnia, Migraine, Panic disorder, and PTSD (post-traumatic stress disorder).    SURGICAL HISTORY  patient denies any surgical history    SOCIAL HISTORY  Social History     Tobacco Use    Smoking status: Former     Packs/day: 0.50     Types: Cigarettes    Smokeless tobacco: Never   Vaping Use    Vaping Use: Every day    Substances: Nicotine   Substance Use Topics    Alcohol use: Not Currently     Comment: Socially    Drug use: Not Currently     Types: Methamphetamines      "Comment: stopped 1 week ago      Social History     Substance and Sexual Activity   Drug Use Not Currently    Types: Methamphetamines    Comment: stopped 1 week ago       FAMILY HISTORY  Family History   Problem Relation Age of Onset    Other Mother    Noncontributory    CURRENT MEDICATIONS  Home Medications       Reviewed by Medina Echeverria R.N. (Registered Nurse) on 09/23/22 at 2042  Med List Status: Not Addressed     Medication Last Dose Status   albuterol 108 (90 Base) MCG/ACT Aero Soln inhalation aerosol  Active   alprazolam (XANAX) 0.5 MG TABS  Active   fluoxetine (PROZAC) 20 MG CAPS  Active   fluoxetine (PROZAC) 20 MG CAPS  Active   permethrin (ELIMITE) 5 % Cream  Active                    ALLERGIES  No Known Allergies    PHYSICAL EXAM  VITAL SIGNS: /74   Pulse 85   Temp 36.5 °C (97.7 °F) (Temporal)   Resp 16   Ht 1.626 m (5' 4\")   Wt 97.5 kg (214 lb 15.2 oz)   SpO2 98%   BMI 36.90 kg/m²     General: Alert, no acute distress  Skin: Warm, dry, normal for ethnicity  Head: Normocephalic, atraumatic  Neck: Trachea midline, no tenderness  Eye: PERRL, normal conjunctiva  ENMT: Oral mucosa moist, no pharyngeal erythema or exudate.  Poor dentition throughout, dental caries, tenderness to percussion of the left maxillary first molar as well as the premolar and molars on the mandibular aspect.  No palpable fluctuance, no facial asymmetry.  Cardiovascular: Normal peripheral perfusion  Respiratory: respirations are non-labored  Musculoskeletal: No swelling, no deformity  Neurological: Alert and oriented to person, place, time, and situation  Lymphatics: Mild anterior cervical lymphadenopathy  Psychiatric: Cooperative, appropriate mood & affect          COURSE & MEDICAL DECISION MAKING  Pertinent Labs & Imaging studies reviewed. (See chart for details)    9:18 PM - Patient seen and examined at bedside. Patient will be treated with hydromorphone intramuscular injection, Zofran ODT, clindamycin.  I milligrams " "p.o..  Took detailed history and performed thorough physical exam to evaluate her symptoms. The differential diagnoses include but are not limited to: Dental abscesses, dental caries, cervical lymphadenopathy    Patient Vitals for the past 24 hrs:   BP Temp Temp src Pulse Resp SpO2 Height Weight   09/23/22 2038 -- -- -- -- -- -- 1.626 m (5' 4\") 97.5 kg (214 lb 15.2 oz)   09/23/22 2018 130/74 36.5 °C (97.7 °F) Temporal 85 16 98 % -- --        Decision Making:  This is a 32 y.o. year old female who presents with pain to both the maxilla and mandible acutely, became much more severe yesterday evening.  She has very poor dentition with multiple dental caries and history of several broken teeth and many cavities.  She also has impacted wisdom teeth noted.  Reassuringly she is not febrile, not tachycardic, not hypotensive.  No evidence of serious bacterial illness/septicemia.  No facial asymmetry that would be consistent with a drainable abscess.  I am suspicious however for likely early dental abscesses given the acute severity of the pain.  Appropriate analgesia and antibiotics initiated here in the ED. she will need to follow-up with her dentist soon as possible, given the impacted wisdom teeth I will also refer her to oral surgery.    I reviewed prescription monitoring program for patient's narcotic use before prescribing a scheduled drug.The patient will not drink alcohol nor drive with prescribed medications      In prescribing controlled substances to this patient, I certify that I have obtained and reviewed the medical history this patient I have also made a good pasha effort to obtain applicable records from other providers who have treated the patient and records did not demonstrate any increased risk of substance abuse that would prevent me from prescribing controlled substances.     I have conducted a physical exam and documented it. I have reviewed Ms. Caraballo’s prescription history as maintained by the [place] " Prescription Monitoring Program.     I have assessed the patient’s risk for abuse, dependency, and addiction using the validated Opioid Risk Tool available at https://www.mdcalc.com/biqxvf-qrrb-koka-ort-narcotic-abuse.     Given the above, I believe the benefits of controlled substance therapy outweigh the risks. The reasons for prescribing controlled substances include in my professional opinion, controlled substances are a reasonable choice for this patient. Accordingly, I have discussed the risk and benefits, treatment plan, and alternative therapies with the patient. The patient has been consented for the medication and understands the risks.     I reviewed prescription monitoring program for patient's narcotic use before prescribing a scheduled drug.The patient will not drink alcohol nor drive with prescribed medications. The patient will return for new or worsening symptoms and is stable at the time of discharge.    Patient has had high blood pressure while in the emergency department, felt likely secondary to medical condition. Counseled patient to monitor blood pressure at home and follow up with primary care physician.      DISPOSITION:  Patient will be discharged home in stable condition.    FOLLOW UP:  Mj Wheeler M.D.  [address]  [facility] [address]  [phone]    Schedule an appointment as soon as possible for a visit       Your dentist    Schedule an appointment as soon as possible for a visit       Garrett Campuzano M.D.  [address] [facility] [id number] [phone]    Schedule an appointment as soon as possible for a visit       OUTPATIENT MEDICATIONS:  New Prescriptions    CLINDAMYCIN (CLEOCIN) 300 MG CAP    Take 1 Capsule by mouth 3 times a day for 10 days.    OXYCODONE-ACETAMINOPHEN (PERCOCET) 7.5-325 MG PER TABLET    Take 1 Tablet by mouth every four hours as needed for Severe Pain for up to 5 days.          FINAL IMPRESSION  1. Dental abscess    2. Pain due to dental caries     3. Impacted third molar tooth          Sonya DUARTE M.D. (Scribe), am scribing for, and in the presence of, Sonya Steele MD.    Electronically signed by: Sonya Steele M.D. (Scribe), 9/23/2022    Sonya DUARTE MD personally performed the services described in this documentation, as scribed by Sonya Steele M.D. in my presence, and it is both accurate and complete    The note accurately reflects work and decisions made by me.  Sonya Steele M.D.  9/23/2022  9:28 PM